# Patient Record
Sex: MALE | Race: BLACK OR AFRICAN AMERICAN | Employment: FULL TIME | URBAN - METROPOLITAN AREA
[De-identification: names, ages, dates, MRNs, and addresses within clinical notes are randomized per-mention and may not be internally consistent; named-entity substitution may affect disease eponyms.]

---

## 2022-08-27 ENCOUNTER — APPOINTMENT (OUTPATIENT)
Dept: GENERAL RADIOLOGY | Age: 32
End: 2022-08-27

## 2022-08-27 ENCOUNTER — HOSPITAL ENCOUNTER (EMERGENCY)
Age: 32
Discharge: HOME | End: 2022-08-27
Attending: EMERGENCY MEDICINE

## 2022-08-27 VITALS
RESPIRATION RATE: 10 BRPM | HEIGHT: 72 IN | SYSTOLIC BLOOD PRESSURE: 136 MMHG | TEMPERATURE: 98.9 F | HEART RATE: 73 BPM | BODY MASS INDEX: 26.14 KG/M2 | DIASTOLIC BLOOD PRESSURE: 75 MMHG | OXYGEN SATURATION: 99 % | WEIGHT: 193 LBS

## 2022-08-27 DIAGNOSIS — J06.9 VIRAL URI WITH COUGH: Primary | ICD-10-CM

## 2022-08-27 DIAGNOSIS — B20 HUMAN IMMUNODEFICIENCY VIRUS (HIV) DISEASE (HCC): ICD-10-CM

## 2022-08-27 LAB — SARS-COV-2, NAAT: NOT DETECTED

## 2022-08-27 PROCEDURE — 87635 SARS-COV-2 COVID-19 AMP PRB: CPT

## 2022-08-27 PROCEDURE — 71045 X-RAY EXAM CHEST 1 VIEW: CPT

## 2022-08-27 PROCEDURE — 99284 EMERGENCY DEPT VISIT MOD MDM: CPT

## 2022-08-27 RX ORDER — BICTEGRAVIR SODIUM, EMTRICITABINE, AND TENOFOVIR ALAFENAMIDE FUMARATE 50; 200; 25 MG/1; MG/1; MG/1
TABLET ORAL
Qty: 30 TABLET | Refills: 0 | Status: SHIPPED | OUTPATIENT
Start: 2022-08-27

## 2022-08-27 RX ORDER — BICTEGRAVIR SODIUM, EMTRICITABINE, AND TENOFOVIR ALAFENAMIDE FUMARATE 50; 200; 25 MG/1; MG/1; MG/1
TABLET ORAL
COMMUNITY
Start: 2022-06-18 | End: 2022-08-27

## 2022-08-27 ASSESSMENT — PAIN DESCRIPTION - DESCRIPTORS: DESCRIPTORS: TIGHTNESS

## 2022-08-27 ASSESSMENT — ENCOUNTER SYMPTOMS
ABDOMINAL PAIN: 0
DIARRHEA: 0
SHORTNESS OF BREATH: 0
RHINORRHEA: 0
PHOTOPHOBIA: 0
WHEEZING: 0
NAUSEA: 0
VOMITING: 0
COUGH: 1
BACK PAIN: 0

## 2022-08-27 ASSESSMENT — PAIN SCALES - GENERAL: PAINLEVEL_OUTOF10: 4

## 2022-08-27 ASSESSMENT — PAIN DESCRIPTION - LOCATION: LOCATION: BACK

## 2022-08-27 ASSESSMENT — PAIN DESCRIPTION - ORIENTATION: ORIENTATION: LOWER

## 2022-08-27 NOTE — DISCHARGE INSTRUCTIONS
He was seen for symptoms consistent with upper respiratory infection. Your chest x-ray does not show evidence of pneumonia. Given no fever here. We will represcribe in your [facility]. Take as prescribed. You need to establish care with a primary doctor and infectious disease. Follow-up appointment in the upcoming week. Call and arrange appointment.   Return to emergency department if you have any worsening symptoms or emergent concerns as we discussed

## 2022-08-27 NOTE — ED PROVIDER NOTES
Emergency Department Provider Note  Location: [address] [place] [address]  8/27/2022     Patient Identification  Savannah Douglas is a 33 y.o. male    Chief Complaint  Fever (Pt c/o intermittent fever x 3 days, chest congestion, dry cough. Pt states he also needs Rx for Biktarvy.)          HPI  (History provided by patient)  Patient is a 27-year-old male with a history of HIV on Biktarvy who presents for cough congestion and fever intermittently over the past 3 days. He reports his coworker tested positive for COVID. Patient recently moved from [place] and was followed by infectious disease in [place] and has not been able to refill his prescription for Biktarvy his last dose was 4 to 5 days ago. He has otherwise been compliant. His last CD4 count was a year ago which was over 600. Patient denies any other symptoms. No exacerbating or alleviating factors. Denies shortness of breath or chest pain or lightheadedness. He is vaccinated for SARS-CoV-2. I have reviewed the following nursing documentation:  Allergies: No Known Allergies    Past medical history:  has a past medical history of HIV (human immunodeficiency virus infection) ([facility] [address] Past surgical history:  has no past surgical history on file. Home medications:   Prior to Admission medications    Medication Sig Start Date End Date Taking? Authorizing Provider   BIKTARVY -25 MG TABS per tablet TAKE 1 TABLET EVERY DAY 8/27/22  Yes Brie Regalado MD       Social history:  reports that he has been smoking. He does not have any smokeless tobacco history on file. He reports current alcohol use. He reports current drug use. Drug: Marijuana Chinyere Stevo). Family history:  History reviewed. No pertinent family history. ROS  Review of Systems   Constitutional:  Positive for fever. Negative for chills. HENT:  Positive for congestion. Negative for rhinorrhea. Eyes:  Negative for photophobia and visual disturbance.    Respiratory: Positive for cough. Negative for shortness of breath and wheezing. Cardiovascular:  Negative for chest pain and palpitations. Gastrointestinal:  Negative for abdominal pain, diarrhea, nausea and vomiting. Genitourinary:  Negative for dysuria and hematuria. Musculoskeletal:  Negative for back pain and neck pain. Skin:  Negative for rash and wound. Neurological:  Negative for syncope and weakness. Psychiatric/Behavioral:  Negative for agitation and confusion. Exam  ED Triage Vitals [08/27/22 1000]   BP Temp Temp Source Heart Rate Resp SpO2 Height Weight   136/75 98.9 °F (37.2 °C) Oral 73 10 99 % 6' (1.829 m) 193 lb (87.5 kg)       Physical Exam  Vitals and nursing note reviewed. Constitutional:       General: He is not in acute distress. Appearance: He is well-developed. HENT:      Head: Normocephalic and atraumatic. Nose: Nose normal. No congestion. Eyes:      General: No scleral icterus. Extraocular Movements: Extraocular movements intact. Cardiovascular:      Rate and Rhythm: Normal rate and regular rhythm. Heart sounds: No murmur heard. Pulmonary:      Effort: Pulmonary effort is normal.      Breath sounds: Normal breath sounds. Abdominal:      General: There is no distension. Palpations: Abdomen is soft. Tenderness: no abdominal tenderness   Musculoskeletal:         General: No deformity. Normal range of motion. Cervical back: Normal range of motion and neck supple. No rigidity or tenderness. Skin:     General: Skin is warm. Findings: No rash. Neurological:      Mental Status: He is alert and oriented to person, place, and time. Motor: No abnormal muscle tone.       Coordination: Coordination normal.   Psychiatric:         Mood and Affect: Mood normal.         Behavior: Behavior normal.         ED Course    ED Medication Orders (From admission, onward)      None              Radiology  XR CHEST PORTABLE    Result Date: %.    Patient was given scripts for the following medications. I counseled patient how to take these medications. Current Discharge Medication List          Disposition referral (if applicable):  Karyn Carpio  [phone]  Schedule an appointment as soon as possible for a visit       Khoa Harry MD  [address] [facility]  [address]  [phone]    Schedule an appointment as soon as possible for a visit       Infectious Diseases  [phone]  [phone]  Schedule an appointment as soon as possible for a visit       I, [address], am the primary attending of record and contributed the majority of evaluation and treatment of emergent care for this encounter. Total critical care time is 0 minutes, which excludes separately billable procedures and updating family. Time spent is specifically for management of the presenting complaint and symptoms initially, direct bedside care, reevaluation, review of records, and consultation. There was a high probability of clinically significant life-threatening deterioration in the patient's condition, which required my urgent intervention. This chart was generated in part by using Dragon Dictation system and may contain errors related to that system including errors in grammar, punctuation, and spelling, as well as words and phrases that may be inappropriate. If there are any questions or concerns please feel free to contact the dictating provider for clarification.      MD Veronica Cruz MD  08/27/22 [id number]